# Patient Record
Sex: MALE | URBAN - METROPOLITAN AREA
[De-identification: names, ages, dates, MRNs, and addresses within clinical notes are randomized per-mention and may not be internally consistent; named-entity substitution may affect disease eponyms.]

---

## 2019-05-24 ENCOUNTER — APPOINTMENT (RX ONLY)
Dept: URBAN - METROPOLITAN AREA CLINIC 95 | Facility: CLINIC | Age: 59
Setting detail: DERMATOLOGY
End: 2019-05-24

## 2019-05-24 DIAGNOSIS — L30.9 DERMATITIS, UNSPECIFIED: ICD-10-CM

## 2019-05-24 PROBLEM — B20 HUMAN IMMUNODEFICIENCY VIRUS [HIV] DISEASE: Status: ACTIVE | Noted: 2019-05-24

## 2019-05-24 PROBLEM — F41.9 ANXIETY DISORDER, UNSPECIFIED: Status: ACTIVE | Noted: 2019-05-24

## 2019-05-24 PROCEDURE — 99203 OFFICE O/P NEW LOW 30 MIN: CPT

## 2019-05-24 PROCEDURE — ? TREATMENT REGIMEN

## 2019-05-24 PROCEDURE — ? PRESCRIPTION

## 2019-05-24 RX ORDER — TRIAMCINOLONE ACETONIDE 1 MG/G
THIN LAYER CREAM TOPICAL BID
Qty: 1 | Refills: 1 | Status: ERX | COMMUNITY
Start: 2019-05-24

## 2019-05-24 RX ORDER — HYDROXYZINE HYDROCHLORIDE 25 MG/1
2 TABLET, FILM COATED ORAL
Qty: 60 | Refills: 0 | Status: ERX | COMMUNITY
Start: 2019-05-24

## 2019-05-24 RX ADMIN — HYDROXYZINE HYDROCHLORIDE 2: 25 TABLET, FILM COATED ORAL at 19:26

## 2019-05-24 RX ADMIN — TRIAMCINOLONE ACETONIDE THIN LAYER: 1 CREAM TOPICAL at 19:25

## 2019-05-24 ASSESSMENT — SEVERITY ASSESSMENT: SEVERITY: MODERATE TO SEVERE

## 2019-05-24 ASSESSMENT — PAIN INTENSITY VAS: HOW INTENSE IS YOUR PAIN 0 BEING NO PAIN, 10 BEING THE MOST SEVERE PAIN POSSIBLE?: NO PAIN

## 2019-05-24 NOTE — PROCEDURE: TREATMENT REGIMEN
Detail Level: Zone
Plan: Reviewed gentle skin care treatment, currently using Cetaphil wash. Will start TAC .1%  crm BID. Discussed probable due to new diagnosis of HIV and is not controlled at present. Recommend Zyrtec in morning and Atarax 25mg 1-2 at night as needed.

## 2019-07-16 RX ORDER — HYDROXYZINE HYDROCHLORIDE 25 MG/1
1 TABLET, FILM COATED ORAL
Qty: 30 | Refills: 0 | Status: ERX

## 2022-09-27 NOTE — HPI: RASH
What Type Of Note Output Would You Prefer (Optional)?: Standard Output
How Severe Is Your Rash?: moderate
Is This A New Presentation, Or A Follow-Up?: Rash
Additional History: Rash started 1 year ago but was mild and came and went. Started on face, worsened 3 months ago was then hospitalized in April for Pneumonia and settled down started to worsen again. Did see PCP and ID doctors who referred to Derm. Was only given Hydroxazine 25mg TID.
DNR/DNI